# Patient Record
Sex: FEMALE | Race: BLACK OR AFRICAN AMERICAN | NOT HISPANIC OR LATINO | Employment: OTHER | URBAN - METROPOLITAN AREA
[De-identification: names, ages, dates, MRNs, and addresses within clinical notes are randomized per-mention and may not be internally consistent; named-entity substitution may affect disease eponyms.]

---

## 2018-07-20 ENCOUNTER — OFFICE VISIT CONVERTED (OUTPATIENT)
Dept: FAMILY MEDICINE CLINIC | Facility: CLINIC | Age: 68
End: 2018-07-20
Attending: FAMILY MEDICINE

## 2018-09-24 ENCOUNTER — OFFICE VISIT CONVERTED (OUTPATIENT)
Dept: FAMILY MEDICINE CLINIC | Facility: CLINIC | Age: 68
End: 2018-09-24
Attending: FAMILY MEDICINE

## 2019-01-22 ENCOUNTER — OFFICE VISIT CONVERTED (OUTPATIENT)
Dept: FAMILY MEDICINE CLINIC | Facility: CLINIC | Age: 69
End: 2019-01-22
Attending: FAMILY MEDICINE

## 2019-01-23 ENCOUNTER — HOSPITAL ENCOUNTER (OUTPATIENT)
Dept: FAMILY MEDICINE CLINIC | Facility: CLINIC | Age: 69
Discharge: HOME | End: 2019-01-23
Attending: FAMILY MEDICINE

## 2019-01-23 LAB
ALBUMIN SERPL-MCNC: 4.2 G/DL (ref 3.5–5)
ALBUMIN/GLOB SERPL: 1.3 {RATIO} (ref 1.4–2.6)
ALP SERPL-CCNC: 89 U/L (ref 43–160)
ALT SERPL-CCNC: 12 U/L (ref 10–40)
ANION GAP SERPL CALC-SCNC: 16 MMOL/L (ref 8–19)
AST SERPL-CCNC: 16 U/L (ref 15–50)
BILIRUB SERPL-MCNC: 0.29 MG/DL (ref 0.2–1.3)
BUN SERPL-MCNC: 12 MG/DL (ref 5–25)
BUN/CREAT SERPL: 14 {RATIO} (ref 6–20)
CALCIUM SERPL-MCNC: 10.5 MG/DL (ref 8.7–10.4)
CHLORIDE SERPL-SCNC: 98 MMOL/L (ref 99–111)
CHOLEST SERPL-MCNC: 258 MG/DL (ref 107–200)
CHOLEST/HDLC SERPL: 3.9 {RATIO} (ref 3–6)
CONV CO2: 27 MMOL/L (ref 22–32)
CONV CREATININE URINE, RANDOM: 63.4 MG/DL (ref 10–300)
CONV MICROALBUM.,U,RANDOM: <12 MG/L (ref 0–20)
CONV TOTAL PROTEIN: 7.5 G/DL (ref 6.3–8.2)
CREAT UR-MCNC: 0.87 MG/DL (ref 0.5–0.9)
EST. AVERAGE GLUCOSE BLD GHB EST-MCNC: 143 MG/DL
GFR SERPLBLD BASED ON 1.73 SQ M-ARVRAT: >60 ML/MIN/{1.73_M2}
GLOBULIN UR ELPH-MCNC: 3.3 G/DL (ref 2–3.5)
GLUCOSE SERPL-MCNC: 102 MG/DL (ref 65–99)
HBA1C MFR BLD: 6.6 % (ref 3.5–5.7)
HDLC SERPL-MCNC: 67 MG/DL (ref 40–60)
LDLC SERPL CALC-MCNC: 177 MG/DL (ref 70–100)
MICROALBUMIN/CREAT UR: 18.9 MG/G{CRE} (ref 0–35)
OSMOLALITY SERPL CALC.SUM OF ELEC: 284 MOSM/KG (ref 273–304)
POTASSIUM SERPL-SCNC: 4 MMOL/L (ref 3.5–5.3)
SODIUM SERPL-SCNC: 137 MMOL/L (ref 135–147)
TRIGL SERPL-MCNC: 69 MG/DL (ref 40–150)
VLDLC SERPL-MCNC: 14 MG/DL (ref 5–37)

## 2019-09-17 ENCOUNTER — HOSPITAL ENCOUNTER (OUTPATIENT)
Dept: FAMILY MEDICINE CLINIC | Facility: CLINIC | Age: 69
Discharge: HOME | End: 2019-09-17
Attending: FAMILY MEDICINE

## 2019-09-17 ENCOUNTER — CONVERSION ENCOUNTER (OUTPATIENT)
Dept: FAMILY MEDICINE CLINIC | Facility: CLINIC | Age: 69
End: 2019-09-17

## 2019-09-17 ENCOUNTER — OFFICE VISIT CONVERTED (OUTPATIENT)
Dept: FAMILY MEDICINE CLINIC | Facility: CLINIC | Age: 69
End: 2019-09-17
Attending: FAMILY MEDICINE

## 2019-09-17 LAB
ALBUMIN SERPL-MCNC: 4.6 G/DL (ref 3.5–5)
ALBUMIN/GLOB SERPL: 1.4 {RATIO} (ref 1.4–2.6)
ALP SERPL-CCNC: 94 U/L (ref 43–160)
ALT SERPL-CCNC: 13 U/L (ref 10–40)
ANION GAP SERPL CALC-SCNC: 16 MMOL/L (ref 8–19)
AST SERPL-CCNC: 18 U/L (ref 15–50)
BILIRUB SERPL-MCNC: 0.22 MG/DL (ref 0.2–1.3)
BUN SERPL-MCNC: 16 MG/DL (ref 5–25)
BUN/CREAT SERPL: 19 {RATIO} (ref 6–20)
CALCIUM SERPL-MCNC: 10.6 MG/DL (ref 8.7–10.4)
CHLORIDE SERPL-SCNC: 100 MMOL/L (ref 99–111)
CHOLEST SERPL-MCNC: 272 MG/DL (ref 107–200)
CHOLEST/HDLC SERPL: 3.9 {RATIO} (ref 3–6)
CONV CO2: 29 MMOL/L (ref 22–32)
CONV RHEUMATOID FACTOR IGM: <10 [IU]/ML (ref 0–14)
CONV TOTAL PROTEIN: 7.8 G/DL (ref 6.3–8.2)
CREAT UR-MCNC: 0.85 MG/DL (ref 0.5–0.9)
EST. AVERAGE GLUCOSE BLD GHB EST-MCNC: 151 MG/DL
GFR SERPLBLD BASED ON 1.73 SQ M-ARVRAT: >60 ML/MIN/{1.73_M2}
GLOBULIN UR ELPH-MCNC: 3.2 G/DL (ref 2–3.5)
GLUCOSE SERPL-MCNC: 103 MG/DL (ref 65–99)
HBA1C MFR BLD: 6.9 % (ref 3.5–5.7)
HDLC SERPL-MCNC: 70 MG/DL (ref 40–60)
LDLC SERPL CALC-MCNC: 188 MG/DL (ref 70–100)
OSMOLALITY SERPL CALC.SUM OF ELEC: 293 MOSM/KG (ref 273–304)
POTASSIUM SERPL-SCNC: 4.4 MMOL/L (ref 3.5–5.3)
SODIUM SERPL-SCNC: 141 MMOL/L (ref 135–147)
TRIGL SERPL-MCNC: 69 MG/DL (ref 40–150)
VLDLC SERPL-MCNC: 14 MG/DL (ref 5–37)

## 2019-09-18 ENCOUNTER — HOSPITAL ENCOUNTER (OUTPATIENT)
Dept: CARDIOLOGY | Facility: HOSPITAL | Age: 69
Discharge: HOME | End: 2019-09-18
Attending: FAMILY MEDICINE

## 2019-09-18 LAB — CONV ANTI NUCLEAR ANTIBODY WITH REFLEX: NEGATIVE

## 2019-09-24 ENCOUNTER — HOSPITAL ENCOUNTER (OUTPATIENT)
Dept: GENERAL RADIOLOGY | Facility: HOSPITAL | Age: 69
Discharge: HOME | End: 2019-09-24
Attending: FAMILY MEDICINE

## 2019-11-08 ENCOUNTER — HOSPITAL ENCOUNTER (OUTPATIENT)
Dept: MAMMOGRAPHY | Facility: HOSPITAL | Age: 69
Discharge: HOME | End: 2019-11-08
Attending: FAMILY MEDICINE

## 2019-12-03 ENCOUNTER — HOSPITAL ENCOUNTER (OUTPATIENT)
Dept: ULTRASOUND IMAGING | Facility: HOSPITAL | Age: 69
Discharge: HOME | End: 2019-12-03
Attending: FAMILY MEDICINE

## 2019-12-13 ENCOUNTER — OFFICE VISIT CONVERTED (OUTPATIENT)
Dept: FAMILY MEDICINE CLINIC | Facility: CLINIC | Age: 69
End: 2019-12-13
Attending: FAMILY MEDICINE

## 2020-11-24 ENCOUNTER — TELEPHONE CONVERTED (OUTPATIENT)
Dept: FAMILY MEDICINE CLINIC | Facility: CLINIC | Age: 70
End: 2020-11-24
Attending: FAMILY MEDICINE

## 2020-12-01 ENCOUNTER — HOSPITAL ENCOUNTER (OUTPATIENT)
Dept: FAMILY MEDICINE CLINIC | Facility: CLINIC | Age: 70
Discharge: HOME | End: 2020-12-01
Attending: FAMILY MEDICINE

## 2020-12-01 LAB
ALBUMIN SERPL-MCNC: 4.1 G/DL (ref 3.5–5)
ALBUMIN/GLOB SERPL: 1.4 {RATIO} (ref 1.4–2.6)
ALP SERPL-CCNC: 83 U/L (ref 43–160)
ALT SERPL-CCNC: 13 U/L (ref 10–40)
ANION GAP SERPL CALC-SCNC: 12 MMOL/L (ref 8–19)
AST SERPL-CCNC: 18 U/L (ref 15–50)
BILIRUB SERPL-MCNC: 0.22 MG/DL (ref 0.2–1.3)
BUN SERPL-MCNC: 18 MG/DL (ref 5–25)
BUN/CREAT SERPL: 19 {RATIO} (ref 6–20)
CALCIUM SERPL-MCNC: 10.1 MG/DL (ref 8.7–10.4)
CHLORIDE SERPL-SCNC: 102 MMOL/L (ref 99–111)
CHOLEST SERPL-MCNC: 254 MG/DL (ref 107–200)
CHOLEST/HDLC SERPL: 3.9 {RATIO} (ref 3–6)
CONV CO2: 25 MMOL/L (ref 22–32)
CONV CREATININE URINE, RANDOM: 78.6 MG/DL (ref 10–300)
CONV MICROALBUM.,U,RANDOM: <12 MG/L (ref 0–20)
CONV TOTAL PROTEIN: 7.1 G/DL (ref 6.3–8.2)
CREAT UR-MCNC: 0.93 MG/DL (ref 0.5–0.9)
EST. AVERAGE GLUCOSE BLD GHB EST-MCNC: 151 MG/DL
GFR SERPLBLD BASED ON 1.73 SQ M-ARVRAT: >60 ML/MIN/{1.73_M2}
GLOBULIN UR ELPH-MCNC: 3 G/DL (ref 2–3.5)
GLUCOSE SERPL-MCNC: 123 MG/DL (ref 65–99)
HBA1C MFR BLD: 6.9 % (ref 3.5–5.7)
HDLC SERPL-MCNC: 65 MG/DL (ref 40–60)
LDLC SERPL CALC-MCNC: 173 MG/DL (ref 70–100)
MICROALBUMIN/CREAT UR: 15.3 MG/G{CRE} (ref 0–35)
OSMOLALITY SERPL CALC.SUM OF ELEC: 283 MOSM/KG (ref 273–304)
POTASSIUM SERPL-SCNC: 3.8 MMOL/L (ref 3.5–5.3)
SODIUM SERPL-SCNC: 135 MMOL/L (ref 135–147)
TRIGL SERPL-MCNC: 78 MG/DL (ref 40–150)
VLDLC SERPL-MCNC: 16 MG/DL (ref 5–37)

## 2020-12-07 ENCOUNTER — TELEPHONE CONVERTED (OUTPATIENT)
Dept: FAMILY MEDICINE CLINIC | Facility: CLINIC | Age: 70
End: 2020-12-07
Attending: FAMILY MEDICINE

## 2021-04-27 ENCOUNTER — HOSPITAL ENCOUNTER (OUTPATIENT)
Dept: FAMILY MEDICINE CLINIC | Facility: CLINIC | Age: 71
Discharge: HOME | End: 2021-04-27
Attending: FAMILY MEDICINE

## 2021-04-27 LAB
ALBUMIN SERPL-MCNC: 4.4 G/DL (ref 3.5–5)
ALBUMIN/GLOB SERPL: 1.4 {RATIO} (ref 1.4–2.6)
ALP SERPL-CCNC: 83 U/L (ref 43–160)
ALT SERPL-CCNC: 15 U/L (ref 10–40)
ANION GAP SERPL CALC-SCNC: 15 MMOL/L (ref 8–19)
AST SERPL-CCNC: 20 U/L (ref 15–50)
BILIRUB SERPL-MCNC: 0.17 MG/DL (ref 0.2–1.3)
BUN SERPL-MCNC: 13 MG/DL (ref 5–25)
BUN/CREAT SERPL: 14 {RATIO} (ref 6–20)
CALCIUM SERPL-MCNC: 10.6 MG/DL (ref 8.7–10.4)
CHLORIDE SERPL-SCNC: 100 MMOL/L (ref 99–111)
CHOLEST SERPL-MCNC: 252 MG/DL (ref 107–200)
CHOLEST/HDLC SERPL: 3.8 {RATIO} (ref 3–6)
CONV CO2: 26 MMOL/L (ref 22–32)
CONV TOTAL PROTEIN: 7.5 G/DL (ref 6.3–8.2)
CREAT UR-MCNC: 0.93 MG/DL (ref 0.5–0.9)
EST. AVERAGE GLUCOSE BLD GHB EST-MCNC: 148 MG/DL
GFR SERPLBLD BASED ON 1.73 SQ M-ARVRAT: >60 ML/MIN/{1.73_M2}
GLOBULIN UR ELPH-MCNC: 3.1 G/DL (ref 2–3.5)
GLUCOSE SERPL-MCNC: 92 MG/DL (ref 65–99)
HBA1C MFR BLD: 6.8 % (ref 3.5–5.7)
HDLC SERPL-MCNC: 66 MG/DL (ref 40–60)
LDLC SERPL CALC-MCNC: 169 MG/DL (ref 70–100)
OSMOLALITY SERPL CALC.SUM OF ELEC: 284 MOSM/KG (ref 273–304)
POTASSIUM SERPL-SCNC: 4 MMOL/L (ref 3.5–5.3)
SODIUM SERPL-SCNC: 137 MMOL/L (ref 135–147)
TRIGL SERPL-MCNC: 86 MG/DL (ref 40–150)
VLDLC SERPL-MCNC: 17 MG/DL (ref 5–37)

## 2021-05-07 ENCOUNTER — TELEMEDICINE CONVERTED (OUTPATIENT)
Dept: FAMILY MEDICINE CLINIC | Facility: CLINIC | Age: 71
End: 2021-05-07
Attending: FAMILY MEDICINE

## 2021-05-13 NOTE — PROGRESS NOTES
Progress Note      Patient Name: Bety Sr   Patient ID: [id number]   Sex: Female   YOB: 1950    Primary Care Provider: Yaa Shepherd MD    Visit Date: November 24, 2020    Provider: Yaa Shepherd MD   [place] [facility] [place] Address: [address] [place] [address], [address] [place] [address]  [id number]   Location Phone: [phone]          Chief Complaint  · HTN follow up      History Of Present Illness  TELEHEALTH TELEPHONE VISIT  Bety Sr is a 70 year old /Black female who is presenting for evaluation via telehealth telephone visit. Verbal consent obtained before beginning visit.   Provider spent 25 minutes with patient during telehealth visit.   The following staff were present during this visit: Chantal Gibbons   Past Medical History/Overview of Patient Symptoms     HTN- pt reports that the clonidine was making her feel like her head was cloudy so we will be restarting Lisinopril 10mg.  Pt reports that she has been checking her blood pressures and they have been 160-170s systolic.  I will be following back up with her in 2 weeks.     DM type 2- pt last Hgb A1C was done Sept 2019 and was well controlled at 6.9 but we need to recheck it along with her cholesterol.     Hyperlipidemiapatient cholesterol levels was checked September 2020 and her cholesterol was 272 and LDL was 188 HDL was 70.  We will be rechecking her cholesterol levels fasting.  Follow-up in 2 weeks    Hot flashespatient reports that she was taking the clonidine for the hot flashes but really has not helped her as well.  We will reassess her hot flashes after getting her blood pressure under control.       Past Medical History  Cataract; Diabetes mellitus; Hyperlipidemia; Hypertension; Night sweat         Past Surgical History  Cesarian Section; Colonscopy; Hysterectomy; Tonsilectomy         Medication List  Aspirin Low Dose 81 mg oral tablet,delayed release (DR/EC); clonidine HCl  0.2 mg oral tablet         Allergy List  NO KNOWN DRUG ALLERGIES         Family Medical History  Heart Disease; Diabetes, unspecified type         Reproductive History   4 Para 3 0 0 0       Social History  Tobacco (Never)         Review of Systems  · Constitutional  o Denies  o : fatigue, fever, weight loss, weight gain  · Eyes  o Denies  o : eye discomfort, eye pain  · HENT  o Denies  o : vertigo, nasal congestion  · Genitourinary  o Denies  o : frequency, dysuria  · Integument  o Denies  o : rash, itching  · Neurologic  o Denies  o : muscular weakness, memory difficulties  · Musculoskeletal  o Denies  o : joint swelling, muscle pain  · Psychiatric  o Denies  o : anxiety, depression  · Heme-Lymph  o Denies  o : lightheadedness, easy bleeding  · Allergic-Immunologic  o Denies  o : sinus allergy symptoms, allergic dermatitis          Assessment  · Diabetes mellitus, type 2     250.00/E11.9  · Essential hypertension     401.9/I10  · Hyperlipidemia     272.4/E78.5  · Hot flashes due to menopause     627.2/N95.1      Plan  · Orders  o Diabetes 2 Panel (Urine Microalbumin, CMP, Lipid, A1c, ) [facility] (55664, 38545, 61158, 95019) - 250.00/E11.9 - 2020  o ACO-39: Current medications updated and reviewed (, 1159F) - - 2020  o Physician Telephone Evaluation, 21-30 minutes (51939) - 250.00/E11.9, 401.9/I10, 627.2/N95.1 - 2020  · Medications  o lisinopril 10 mg oral tablet   SIG: take 1 tablet (10 mg) by oral route once daily for 30 days   DISP: (30) Tablet with 2 refills  Prescribed on 2020     o Medications have been Reconciled  o Transition of Care or Provider Policy  · Instructions  o Advised that cheeses and other sources of dairy fats, animal fats, fast food, and the extras (candy, pastries, pies, doughnuts and cookies) all contain LDL raising nutrients. Advised to increase fruits, vegetables, whole grains, and to monitor portion sizes.   o Plan Of Care:   · Disposition  o Return Visit  Request in/on 2 weeks +/- 0 days (55727).            Electronically Signed by: Yaa Shepherd MD -Author on November 24, 2020 11:13:48 AM

## 2021-05-13 NOTE — PROGRESS NOTES
Progress Note      Patient Name: Bety Sr   Patient ID: [id number]   Sex: Female   YOB: 1950    Primary Care Provider: Yaa Shepherd MD   Referring Provider: Yaa Shepherd MD    Visit Date: December 7, 2020    Provider: Yaa Shepherd MD   Location: [facility] [place]   Location Address: [address] [place] [address], [address] [place] [address]  [id number]   Location Phone: [phone]          Chief Complaint  · DM type 2 follow up      History Of Present Illness  TELEHEALTH TELEPHONE VISIT  Bety Sr is a 70 year old /Black female who is presenting for evaluation via telehealth telephone visit. Verbal consent obtained before beginning visit.   Provider spent 30 minutes with patient during telehealth visit.   The following staff were present during this visit: Chantal Gibbons   Past Medical History/Overview of Patient Symptoms     Hyperlipidemiapatient's cholesterol level continues to be elevated with her cholesterol being 254 and LDL being 173.  Patient reports that she was on atorvastatin in the past and it caused her to have some muscle cramping so I will be putting her on pravastatin 10 mg just to provide treatment for her hyperlipidemia.    Hypertensionpatient reports that her blood pressures have been 144/78 146/91 and this morning was 148/79.  Patient reports that she did have one that was 133 systolic.  She is currently taking lisinopril 10 mg once a day and I will do not feel the need to increase her since she is at her goal of being less than 150/90.    Diabetes type 2patient hemoglobin A1c was 6.9 which is consistent with her last hemoglobin A1c which was checked last year.  Patient is following a diabetic diet.       Past Medical History  Cataract; Diabetes mellitus; Hyperlipidemia; Hypertension; Night sweat         Past Surgical History  Cesarian Section; Colonscopy; Hysterectomy; Tonsilectomy         Medication List  Aspirin Low Dose 81 mg oral  tablet,delayed release (DR/EC); clonidine HCl 0.2 mg oral tablet; lisinopril 10 mg oral tablet         Allergy List  NO KNOWN DRUG ALLERGIES         Family Medical History  Heart Disease; Diabetes, unspecified type         Reproductive History   4 Para 3 0 0 0       Social History  Tobacco (Never)         Review of Systems  · Constitutional  o Denies  o : fatigue, fever, weight loss, weight gain  · Eyes  o Denies  o : eye discomfort, eye pain  · HENT  o Denies  o : vertigo, nasal congestion  · Cardiovascular  o Denies  o : chest pain, irregular heart beats  · Respiratory  o Denies  o : shortness of breath, wheezing  · Gastrointestinal  o Denies  o : nausea, vomiting  · Genitourinary  o Denies  o : frequency, dysuria  · Integument  o Denies  o : rash, itching  · Neurologic  o Denies  o : muscular weakness, memory difficulties  · Musculoskeletal  o Denies  o : joint swelling, muscle pain  · Psychiatric  o Denies  o : anxiety, depression  · Heme-Lymph  o Denies  o : lightheadedness, easy bleeding  · Allergic-Immunologic  o Denies  o : sinus allergy symptoms, allergic dermatitis          Assessment  · Essential hypertension     401.9/I10  · Hyperlipidemia     272.4/E78.5      Plan  · Orders  o HTN/Lipid Panel (CMP, Lipid) [facility] (11002, 59550) - 401.9/I10 - 2021 - - (Future Order)  o ACO-39: Current medications updated and reviewed (1159F, ) - - 2020  o Physician Telephone Evaluation, 21-30 minutes (60805) - 272.4/E78.5, 401.9/I10 - 2020  · Medications  o pravastatin 10 mg oral tablet   SIG: take 1 tablet (10 mg) by oral route once daily at bedtime for 90 days   DISP: (90) Tablet with 1 refills  Prescribed on 2020     o clonidine HCl 0.2 mg oral tablet   SIG: take 1 tablet (0.2 mg) by oral route once daily for 30 days   DISP: (30) Tablet with 0 refills  Refilled on 2020     o lisinopril 10 mg oral tablet   SIG: take 1 tablet (10 mg) by oral route once daily for 30 days   DISP: (30)  Tablet with 2 refills  Refilled on 12/07/2020     o Medications have been Reconciled  o Transition of Care or Provider Policy  · Instructions  o Advised that cheeses and other sources of dairy fats, animal fats, fast food, and the extras (candy, pastries, pies, doughnuts and cookies) all contain LDL raising nutrients. Advised to increase fruits, vegetables, whole grains, and to monitor portion sizes.   o Plan Of Care:   o Chronic conditions reviewed and taken into consideration for today's treatment plan.  · Disposition  o Return Visit Request in/on 2 months +/- 0 days (74133).            Electronically Signed by: Yaa Shepherd MD -Author on December 7, 2020 10:03:21 AM

## 2021-05-15 VITALS
HEIGHT: 61 IN | DIASTOLIC BLOOD PRESSURE: 76 MMHG | OXYGEN SATURATION: 97 % | TEMPERATURE: 98.2 F | BODY MASS INDEX: 27.4 KG/M2 | WEIGHT: 145.12 LBS | HEART RATE: 63 BPM | SYSTOLIC BLOOD PRESSURE: 146 MMHG

## 2021-05-15 VITALS
BODY MASS INDEX: 26.81 KG/M2 | DIASTOLIC BLOOD PRESSURE: 82 MMHG | HEIGHT: 61 IN | SYSTOLIC BLOOD PRESSURE: 142 MMHG | WEIGHT: 142 LBS | HEART RATE: 63 BPM | TEMPERATURE: 97.9 F | OXYGEN SATURATION: 99 %

## 2021-05-15 VITALS
RESPIRATION RATE: 14 BRPM | SYSTOLIC BLOOD PRESSURE: 138 MMHG | OXYGEN SATURATION: 97 % | HEART RATE: 66 BPM | BODY MASS INDEX: 27.56 KG/M2 | HEIGHT: 61 IN | TEMPERATURE: 97.8 F | DIASTOLIC BLOOD PRESSURE: 86 MMHG | WEIGHT: 146 LBS

## 2021-05-16 VITALS
TEMPERATURE: 98.5 F | SYSTOLIC BLOOD PRESSURE: 142 MMHG | BODY MASS INDEX: 27 KG/M2 | DIASTOLIC BLOOD PRESSURE: 72 MMHG | HEART RATE: 80 BPM | OXYGEN SATURATION: 96 % | HEIGHT: 61 IN | WEIGHT: 143 LBS

## 2021-05-16 VITALS
BODY MASS INDEX: 26.62 KG/M2 | HEIGHT: 61 IN | HEART RATE: 78 BPM | TEMPERATURE: 97.9 F | OXYGEN SATURATION: 98 % | RESPIRATION RATE: 16 BRPM | DIASTOLIC BLOOD PRESSURE: 86 MMHG | WEIGHT: 141 LBS | SYSTOLIC BLOOD PRESSURE: 136 MMHG

## 2021-06-05 NOTE — PROGRESS NOTES
Progress Note      Patient Name: Bety Sr   Patient ID: [id number]   Sex: Female   YOB: 1950    Primary Care Provider: Yaa Shepherd MD   Referring Provider: Yaa Shepherd MD    Visit Date: May 7, 2021    Provider: Yaa Shepherd MD   [place] [facility] [place]   Location Address: [address] [place] [address], [address]  [id number]   Location Phone: [phone]          Chief Complaint  · Hypertension follow up      History Of Present Illness  Bety Sr is a 70 year old /Black female who presents for evaluation and treatment of:   Video Conferencing Visit  Bety Sr is a 70 year old /Black female who is presenting for evaluation via video conferencing via [facility]. Verbal consent obtained before beginning visit.   The following staff were present during this visit: Ana Chau   TELEHEALTH TELEPHONE VISIT  Bety Sr is a 70 year old /Black female who is presenting for evaluation via telehealth telephone visit. Verbal consent obtained before beginning visit.   Provider spent 25 minutes with patient during telehealth visit.   The following staff were present during this visit: Ana Chau   Past Medical History/Overview of Patient Symptoms     HTN- pt reports that her bp has been averaging 160s systolic over 80s.  pt reports she is compliant with taking her Lisinopril 10mg daily.  I will be increasing her to 20mg once a day.    DM type 2- pt A1C decreased from 6.9 to 6.8 which is good and under our goal of less than 7.      Hyperlipidemia- pt reports she has been getting pain in her legs with taking the Pravastatin so I will be discontinuing and starting her on Zetia 10mg once a day.       Past Medical History  Cataract; Diabetes mellitus; Hyperlipidemia; Hypertension; Night sweat         Past Surgical History  Cesarian Section; Colonscopy; Hysterectomy; Tonsilectomy         Medication  List  Aspirin Low Dose 81 mg oral tablet,delayed release (DR/EC); clonidine HCl 0.2 mg oral tablet; lisinopril 10 mg oral tablet; pravastatin 10 mg oral tablet         Allergy List  NO KNOWN DRUG ALLERGIES         Family Medical History  Heart Disease; Diabetes, unspecified type         Reproductive History   4 Para 3 0 0 0       Social History  Tobacco (Never)         Review of Systems  · Constitutional  o Denies  o : fatigue, fever, weight loss, weight gain  · Eyes  o Denies  o : eye discomfort, eye pain  · HENT  o Denies  o : vertigo, nasal congestion  · Cardiovascular  o Denies  o : chest pain, irregular heart beats  · Respiratory  o Denies  o : shortness of breath, wheezing  · Gastrointestinal  o Denies  o : nausea, vomiting  · Genitourinary  o Denies  o : frequency, dysuria  · Integument  o Denies  o : rash, itching  · Neurologic  o Denies  o : muscular weakness, memory difficulties  · Musculoskeletal  o Denies  o : joint swelling, muscle pain  · Psychiatric  o Denies  o : anxiety, depression  · Heme-Lymph  o Denies  o : lightheadedness, easy bleeding  · Allergic-Immunologic  o Denies  o : sinus allergy symptoms, allergic dermatitis      Physical Examination     Gen:  AxO x3, no acute distress    HEENT: EOMI     Respiratory:  No labored breathing    Psych: appropriate affect, judgement and insight intact.           Assessment  · Diabetes mellitus, type 2     250.00/E11.9  · Essential hypertension     401.9/I10  · Hyperlipidemia     272.4/E78.5  · Leg pain, bilateral       Pain in right leg     729.5/M79.604  Pain in left leg     729.5/M79.605      Plan  · Orders  o ACO-39: Current medications updated and reviewed (, 1159F) - - 2021  o Physician Telephone Evaluation, 21-30 minutes (75715) - 272.4/E78.5, 401.9/I10, 250.00/E11.9 - 2021  · Medications  o Zetia 10 mg oral tablet   SIG: take 1 tablet (10 mg) by oral route once daily for 90 days   DISP: (90) Tablet with 1 refills  Prescribed  on 05/07/2021     o lisinopril 20 mg oral tablet   SIG: take 1 tablet (20 mg) by oral route once daily for 90 days   DISP: (90) Tablet with 1 refills  Adjusted on 05/07/2021     o pravastatin 10 mg oral tablet   SIG: take 1 tablet (10 mg) by oral route once daily at bedtime for 90 days   DISP: (90) Tablet with 1 refills  Discontinued on 05/07/2021     o Medications have been Reconciled  o Transition of Care or Provider Policy  · Instructions  o Advised that cheeses and other sources of dairy fats, animal fats, fast food, and the extras (candy, pastries, pies, doughnuts and cookies) all contain LDL raising nutrients. Advised to increase fruits, vegetables, whole grains, and to monitor portion sizes.   o Patient was educated/instructed on their diagnosis, treatment and medications prior to discharge from the clinic today.  o Plan Of Care:   · Disposition  o Return Visit Request in/on 1 month +/- 0 days (27464).            Electronically Signed by: Yaa Shepherd MD -Author on May 7, 2021 06:02:30 PM

## 2021-11-01 ENCOUNTER — OFFICE VISIT (OUTPATIENT)
Dept: FAMILY MEDICINE CLINIC | Facility: CLINIC | Age: 71
End: 2021-11-01

## 2021-11-01 VITALS
WEIGHT: 145.4 LBS | DIASTOLIC BLOOD PRESSURE: 72 MMHG | BODY MASS INDEX: 26.76 KG/M2 | SYSTOLIC BLOOD PRESSURE: 136 MMHG | TEMPERATURE: 98.3 F | HEART RATE: 68 BPM | HEIGHT: 62 IN | OXYGEN SATURATION: 99 %

## 2021-11-01 DIAGNOSIS — Z00.00 MEDICARE ANNUAL WELLNESS VISIT, SUBSEQUENT: ICD-10-CM

## 2021-11-01 DIAGNOSIS — Z12.31 BREAST CANCER SCREENING BY MAMMOGRAM: ICD-10-CM

## 2021-11-01 DIAGNOSIS — E11.69 TYPE 2 DIABETES MELLITUS WITH OTHER SPECIFIED COMPLICATION, WITHOUT LONG-TERM CURRENT USE OF INSULIN (HCC): ICD-10-CM

## 2021-11-01 DIAGNOSIS — E78.5 HYPERLIPIDEMIA, UNSPECIFIED HYPERLIPIDEMIA TYPE: Primary | ICD-10-CM

## 2021-11-01 DIAGNOSIS — I10 PRIMARY HYPERTENSION: ICD-10-CM

## 2021-11-01 PROBLEM — H26.9 CATARACT: Status: ACTIVE | Noted: 2021-11-01

## 2021-11-01 PROBLEM — R61 NIGHT SWEAT: Status: ACTIVE | Noted: 2021-11-01

## 2021-11-01 PROBLEM — E11.9 DIABETES MELLITUS: Status: ACTIVE | Noted: 2021-11-01

## 2021-11-01 PROCEDURE — 1160F RVW MEDS BY RX/DR IN RCRD: CPT | Performed by: FAMILY MEDICINE

## 2021-11-01 PROCEDURE — 1170F FXNL STATUS ASSESSED: CPT | Performed by: FAMILY MEDICINE

## 2021-11-01 PROCEDURE — 96160 PT-FOCUSED HLTH RISK ASSMT: CPT | Performed by: FAMILY MEDICINE

## 2021-11-01 PROCEDURE — G0439 PPPS, SUBSEQ VISIT: HCPCS | Performed by: FAMILY MEDICINE

## 2021-11-01 RX ORDER — EZETIMIBE 10 MG/1
TABLET ORAL
COMMUNITY
Start: 2021-05-24 | End: 2022-08-29

## 2021-11-01 RX ORDER — LISINOPRIL 20 MG/1
TABLET ORAL
COMMUNITY
Start: 2021-08-21 | End: 2022-04-28 | Stop reason: SDUPTHER

## 2021-11-01 NOTE — PROGRESS NOTES
Chief Complaint  Chief Complaint   Patient presents with   • Medicare Wellness-subsequent       HPI:  Bety Sr presents to [facility]     Pt is doing well and is due for her annual wellness.     Patient is due to have her mammogram rechecked.  Patient last mammogram was done in 2019.  Patient reports history of vertigo 19 2020 due to Covid.    Diabetes type 2-patient's blood hemoglobin A1c was done April 2021 it was 6.8.  Patient is able to maintain off of medication for diabetes as long she is able to maintain her A1c less than 7.    Hypertension-patient's blood pressure today is well controlled at 136/72.  Patient currently taking lisinopril 20 mg once a day.      Cologuard-patient had her Cologuard done 2019 and it was negative.      Medical History:  Past History:  Medical History: has no past medical history on file.   Surgical History: has no past surgical history on file.   Family History: family history is not on file.   Social History: reports that she has never smoked. She has never used smokeless tobacco.  Immunization History   Administered Date(s) Administered   • COVID-19 (MODERNA) 1st, 2nd, 3rd Dose Only 04/22/2021, 05/20/2021         Allergies: Patient has no known allergies.     Medications:  Current Outpatient Medications on File Prior to Visit   Medication Sig Dispense Refill   • ezetimibe (Zetia) 10 MG tablet Zetia 10 mg oral tablet take 1 tablet (10 mg) by oral route once daily for 90 days 5/24/2021  Active     • lisinopril (PRINIVIL,ZESTRIL) 20 MG tablet        No current facility-administered medications on file prior to visit.        Health Maintenance Due   Topic Date Due   • DXA SCAN  Never done   • Pneumococcal Vaccine 65+ (1 of 2 - PPSV23) Never done   • TDAP/TD VACCINES (1 - Tdap) Never done   • ZOSTER VACCINE (1 of 2) Never done   • INFLUENZA VACCINE  Never done   • HEPATITIS C SCREENING  Never done   • DIABETIC FOOT EXAM  Never done   •  "HEMOGLOBIN A1C  11/01/2021   • DIABETIC EYE EXAM  11/01/2021   • COVID-19 Vaccine (3 - Booster for Moderna series) 11/20/2021       Vital Signs:   Vitals:    11/01/21 1342   BP: 136/72   Pulse: 68   Temp: 98.3 °F (36.8 °C)   SpO2: 99%   Weight: 66 kg (145 lb 6.4 oz)   Height: 156.2 cm (61.5\")      Body mass index is 27.03 kg/m².     ROS:  Review of Systems   Constitutional: Negative for fatigue and fever.   HENT: Negative for congestion, ear pain and sinus pressure.    Respiratory: Negative for cough, chest tightness and shortness of breath.    Cardiovascular: Negative for chest pain, palpitations and leg swelling.   Gastrointestinal: Negative for abdominal pain and diarrhea.   Genitourinary: Negative for dysuria and frequency.   Neurological: Negative for speech difficulty, headache and confusion.   Psychiatric/Behavioral: Negative for agitation and behavioral problems.          Physical Exam  Constitutional:       Appearance: Normal appearance.   HENT:      Right Ear: Tympanic membrane normal.      Left Ear: Tympanic membrane normal.      Nose: Nose normal.   Eyes:      Extraocular Movements: Extraocular movements intact.      Conjunctiva/sclera: Conjunctivae normal.      Pupils: Pupils are equal, round, and reactive to light.   Cardiovascular:      Rate and Rhythm: Normal rate and regular rhythm.   Pulmonary:      Effort: Pulmonary effort is normal.      Breath sounds: Normal breath sounds.   Abdominal:      General: Bowel sounds are normal.   Musculoskeletal:         General: Normal range of motion.      Cervical back: Normal range of motion.   Skin:     General: Skin is warm and dry.   Neurological:      General: No focal deficit present.      Mental Status: She is alert and oriented to person, place, and time.   Psychiatric:         Mood and Affect: Mood normal.         Behavior: Behavior normal.          Result Review   Hemoglobin A1c (04/27/2021 09:15)  Lipid Panel (04/27/2021 09:15)  Estimated Average " Glucose (04/27/2021 09:15)  Mammo Diagnostic Digital Tomosynthesis Bilateral With CAD (12/03/2019 15:01)  US Breast Bilateral Limited (12/03/2019 15:01)       Diagnoses and all orders for this visit:    1. Hyperlipidemia, unspecified hyperlipidemia type (Primary)  Assessment & Plan:  Lipid abnormalities are improving with treatment.  Pharmacotherapy as ordered.  Lipids will be reassessed in 3 months.    Orders:  -     Lipid Panel; Future    2. Primary hypertension    3. Type 2 diabetes mellitus with other specified complication, without long-term current use of insulin (HCC)  -     Hemoglobin A1c; Future  -     Comprehensive Metabolic Panel; Future    4. Breast cancer screening by mammogram  Assessment & Plan:  Pt was counseled on healthy eating habits to maintain good balanced nutrition and healthy weight.  Pt was counseled on increasing physical activity.  Pt was advised to practice safe sexual activities. Discouraged from the misuse of alcohol, tobacco and drugs.     Orders:  -     Cancel: Mammo Screening, Bilateral (Annually); Future  -     Mammo Screening, Bilateral (Annually); Future    5. Medicare annual wellness visit, subsequent  Assessment & Plan:  Pt was counseled on healthy eating habits to maintain good balanced nutrition and healthy weight.  Pt was counseled on increasing physical activity.  Pt was advised to practice safe sexual activities. Discouraged from the misuse of alcohol, tobacco and drugs.             Smoking Cessation:    Bety Sr  reports that she has never smoked. She has never used smokeless tobacco.          Follow Up   Return in about 6 months (around 5/1/2022).  Patient was given instructions and counseling regarding her condition or for health maintenance advice. Please see specific information pulled into the AVS if appropriate.       Yaa Shepherd MD

## 2021-11-02 PROBLEM — Z12.31 BREAST CANCER SCREENING BY MAMMOGRAM: Status: ACTIVE | Noted: 2021-11-02

## 2021-11-26 PROBLEM — Z00.00 MEDICARE ANNUAL WELLNESS VISIT, SUBSEQUENT: Status: ACTIVE | Noted: 2021-11-02

## 2021-11-26 NOTE — ASSESSMENT & PLAN NOTE
Lipid abnormalities are improving with treatment.  Pharmacotherapy as ordered.  Lipids will be reassessed in 3 months.  
Pt was counseled on healthy eating habits to maintain good balanced nutrition and healthy weight.  Pt was counseled on increasing physical activity.  Pt was advised to practice safe sexual activities. Discouraged from the misuse of alcohol, tobacco and drugs.   
FAMILY HISTORY:  No pertinent family history in first degree relatives

## 2022-01-25 ENCOUNTER — OFFICE VISIT (OUTPATIENT)
Dept: FAMILY MEDICINE CLINIC | Facility: CLINIC | Age: 72
End: 2022-01-25

## 2022-01-25 VITALS
DIASTOLIC BLOOD PRESSURE: 88 MMHG | SYSTOLIC BLOOD PRESSURE: 142 MMHG | TEMPERATURE: 97.2 F | OXYGEN SATURATION: 97 % | HEIGHT: 61 IN | RESPIRATION RATE: 14 BRPM | BODY MASS INDEX: 27.23 KG/M2 | WEIGHT: 144.2 LBS | HEART RATE: 65 BPM

## 2022-01-25 DIAGNOSIS — Z12.31 BREAST CANCER SCREENING BY MAMMOGRAM: ICD-10-CM

## 2022-01-25 DIAGNOSIS — E11.69 TYPE 2 DIABETES MELLITUS WITH OTHER SPECIFIED COMPLICATION, WITHOUT LONG-TERM CURRENT USE OF INSULIN: ICD-10-CM

## 2022-01-25 DIAGNOSIS — R10.13 EPIGASTRIC PAIN: Primary | ICD-10-CM

## 2022-01-25 DIAGNOSIS — Z12.11 COLON CANCER SCREENING: ICD-10-CM

## 2022-01-25 DIAGNOSIS — R31.9 HEMATURIA, UNSPECIFIED TYPE: ICD-10-CM

## 2022-01-25 DIAGNOSIS — E78.5 HYPERLIPIDEMIA, UNSPECIFIED HYPERLIPIDEMIA TYPE: ICD-10-CM

## 2022-01-25 DIAGNOSIS — R10.13 DYSPEPSIA: ICD-10-CM

## 2022-01-25 DIAGNOSIS — R23.2 HOT FLASHES: ICD-10-CM

## 2022-01-25 LAB
BILIRUB BLD-MCNC: NEGATIVE MG/DL
CLARITY, POC: CLEAR
COLOR UR: YELLOW
GLUCOSE UR STRIP-MCNC: NEGATIVE MG/DL
KETONES UR QL: NEGATIVE
LEUKOCYTE EST, POC: NEGATIVE
NITRITE UR-MCNC: NEGATIVE MG/ML
PH UR: 6.5 [PH] (ref 5–8)
PROT UR STRIP-MCNC: NEGATIVE MG/DL
RBC # UR STRIP: ABNORMAL /UL
SP GR UR: 1.03 (ref 1–1.03)
UROBILINOGEN UR QL: NORMAL

## 2022-01-25 PROCEDURE — 99214 OFFICE O/P EST MOD 30 MIN: CPT | Performed by: FAMILY MEDICINE

## 2022-01-25 PROCEDURE — 81002 URINALYSIS NONAUTO W/O SCOPE: CPT | Performed by: FAMILY MEDICINE

## 2022-01-25 NOTE — PROGRESS NOTES
Chief Complaint  Chief Complaint   Patient presents with   • Abdominal Pain       HPI:  Bety Sr presents to [facility]     Hot flashes- Pt reports she is taking Bonafide which is a tree pollen supplement for hot flashes.  Pt reports she is going to take it for at least 3 months.     Epigastric pain- pt reports that she has been having worsening epigastric discomfort after she eats for the last few months.  She is due to have her colon cancer screening this year.  She previously had a negative cologuard in 2019.      DM Type 2- pt last Hgb A1C was 6.8 on 4/27/2021 which was well controlled.  Currently compliant with medication regimen and will continue as prescribed.     HTN- Pt bp today is 142/88 which is well controlled.  Pt is compliant with their medication and will continue their current medication regimen. No side effects to the medication.    Medical History:  Past History:  Medical History: has a past medical history of Hypertension.   Surgical History: has a past surgical history that includes Hysterectomy (1995).   Family History: family history is not on file.   Social History: reports that she has never smoked. She has never used smokeless tobacco. She reports that she does not drink alcohol and does not use drugs.  There is no immunization history for the selected administration types on file for this patient.      Allergies: Patient has no known allergies.     Medications:  Current Outpatient Medications on File Prior to Visit   Medication Sig Dispense Refill   • ezetimibe (Zetia) 10 MG tablet Zetia 10 mg oral tablet take 1 tablet (10 mg) by oral route once daily for 90 days 5/24/2021  Active     • lisinopril (PRINIVIL,ZESTRIL) 20 MG tablet        No current facility-administered medications on file prior to visit.        Health Maintenance Due   Topic Date Due   • DXA SCAN  Never done   • Pneumococcal Vaccine 65+ (1 of 2 - PPSV23) Never done   • TDAP/TD  "VACCINES (1 - Tdap) Never done   • ZOSTER VACCINE (1 of 2) Never done   • HEPATITIS C SCREENING  Never done   • DIABETIC FOOT EXAM  Never done   • HEMOGLOBIN A1C  11/01/2021   • DIABETIC EYE EXAM  11/01/2021   • URINE MICROALBUMIN  12/01/2021   • MAMMOGRAM  12/03/2021       Vital Signs:   Vitals:    01/25/22 1044   BP: 142/88   BP Location: Right arm   Patient Position: Sitting   Pulse: 65   Resp: 14   Temp: 97.2 °F (36.2 °C)   SpO2: 97%   Weight: 65.4 kg (144 lb 3.2 oz)   Height: 154.9 cm (61\")      Body mass index is 27.25 kg/m².     ROS:  Review of Systems   Constitutional: Negative for fatigue and fever.   HENT: Negative for congestion, ear pain and sinus pressure.    Respiratory: Negative for cough, chest tightness and shortness of breath.    Cardiovascular: Negative for chest pain, palpitations and leg swelling.   Gastrointestinal: Positive for abdominal pain and indigestion. Negative for diarrhea.   Genitourinary: Negative for dysuria and frequency.   Neurological: Negative for speech difficulty, headache and confusion.   Psychiatric/Behavioral: Negative for agitation and behavioral problems.          Physical Exam  Constitutional:       Appearance: Normal appearance.   HENT:      Right Ear: Tympanic membrane normal.      Left Ear: Tympanic membrane normal.      Nose: Nose normal.   Eyes:      Extraocular Movements: Extraocular movements intact.      Conjunctiva/sclera: Conjunctivae normal.      Pupils: Pupils are equal, round, and reactive to light.   Cardiovascular:      Rate and Rhythm: Normal rate and regular rhythm.   Pulmonary:      Effort: Pulmonary effort is normal.      Breath sounds: Normal breath sounds.   Abdominal:      General: Bowel sounds are normal. There is no distension.      Palpations: There is no mass.      Tenderness: There is no guarding.   Musculoskeletal:         General: Normal range of motion.      Cervical back: Normal range of motion.   Skin:     General: Skin is warm and dry. "   Neurological:      General: No focal deficit present.      Mental Status: She is alert and oriented to person, place, and time.   Psychiatric:         Mood and Affect: Mood normal.         Behavior: Behavior normal.          Result Review    Hemoglobin A1c (04/27/2021 09:15)  Lipid Panel (04/27/2021 09:15)  Mammo Diagnostic Digital Tomosynthesis Bilateral With CAD (12/03/2019 15:01)       Diagnoses and all orders for this visit:    1. Epigastric pain (Primary)  -     Amylase  -     Helicobacter Pylori, IgA IgG IgM  -     Lipase    2. Hematuria, unspecified type  -     POCT urinalysis dipstick, manual    3. Type 2 diabetes mellitus with other specified complication, without long-term current use of insulin (HCC)  -     Hemoglobin A1c  -     Comprehensive Metabolic Panel    4. Hyperlipidemia, unspecified hyperlipidemia type  -     Lipid Panel    5. Dyspepsia  -     Ambulatory Referral to Gastroenterology    6. Colon cancer screening  -     Ambulatory Referral to Gastroenterology    7. Breast cancer screening by mammogram  -     Mammo Screening Digital Tomosynthesis Bilateral With CAD; Future    8. Hot flashes          Smoking Cessation:    Bety Sr  reports that she has never smoked. She has never used smokeless tobacco.          Follow Up   Return in about 4 weeks (around 2/22/2022).  Patient was given instructions and counseling regarding her condition or for health maintenance advice. Please see specific information pulled into the AVS if appropriate.       Yaa Shepherd MD

## 2022-01-27 PROBLEM — R23.2 HOT FLASHES: Status: ACTIVE | Noted: 2022-01-27

## 2022-02-10 ENCOUNTER — TELEPHONE (OUTPATIENT)
Dept: GASTROENTEROLOGY | Facility: CLINIC | Age: 72
End: 2022-02-10

## 2022-02-10 ENCOUNTER — LAB (OUTPATIENT)
Dept: LAB | Facility: HOSPITAL | Age: 72
End: 2022-02-10

## 2022-02-10 LAB
ALBUMIN SERPL-MCNC: 5 G/DL (ref 3.5–5.2)
ALBUMIN/GLOB SERPL: 1.6 G/DL
ALP SERPL-CCNC: 87 U/L (ref 39–117)
ALT SERPL W P-5'-P-CCNC: 14 U/L (ref 1–33)
AMYLASE SERPL-CCNC: 87 U/L (ref 28–100)
ANION GAP SERPL CALCULATED.3IONS-SCNC: 9.8 MMOL/L (ref 5–15)
AST SERPL-CCNC: 18 U/L (ref 1–32)
BILIRUB SERPL-MCNC: 0.3 MG/DL (ref 0–1.2)
BUN SERPL-MCNC: 12 MG/DL (ref 8–23)
BUN/CREAT SERPL: 13.5 (ref 7–25)
CALCIUM SPEC-SCNC: 11.1 MG/DL (ref 8.6–10.5)
CHLORIDE SERPL-SCNC: 101 MMOL/L (ref 98–107)
CHOLEST SERPL-MCNC: 296 MG/DL (ref 0–200)
CO2 SERPL-SCNC: 28.2 MMOL/L (ref 22–29)
CREAT SERPL-MCNC: 0.89 MG/DL (ref 0.57–1)
GFR SERPL CREATININE-BSD FRML MDRD: 76 ML/MIN/1.73
GLOBULIN UR ELPH-MCNC: 3.1 GM/DL
GLUCOSE SERPL-MCNC: 106 MG/DL (ref 65–99)
HBA1C MFR BLD: 6.6 % (ref 4.8–5.6)
HDLC SERPL-MCNC: 82 MG/DL (ref 40–60)
LDLC SERPL CALC-MCNC: 203 MG/DL (ref 0–100)
LDLC/HDLC SERPL: 2.43 {RATIO}
LIPASE SERPL-CCNC: 45 U/L (ref 13–60)
POTASSIUM SERPL-SCNC: 4.3 MMOL/L (ref 3.5–5.2)
PROT SERPL-MCNC: 8.1 G/DL (ref 6–8.5)
SODIUM SERPL-SCNC: 139 MMOL/L (ref 136–145)
TRIGL SERPL-MCNC: 73 MG/DL (ref 0–150)
VLDLC SERPL-MCNC: 11 MG/DL (ref 5–40)

## 2022-02-10 PROCEDURE — 80053 COMPREHEN METABOLIC PANEL: CPT | Performed by: FAMILY MEDICINE

## 2022-02-10 PROCEDURE — 80061 LIPID PANEL: CPT | Performed by: FAMILY MEDICINE

## 2022-02-10 PROCEDURE — 82150 ASSAY OF AMYLASE: CPT | Performed by: FAMILY MEDICINE

## 2022-02-10 PROCEDURE — 83036 HEMOGLOBIN GLYCOSYLATED A1C: CPT | Performed by: FAMILY MEDICINE

## 2022-02-10 PROCEDURE — 86677 HELICOBACTER PYLORI ANTIBODY: CPT | Performed by: FAMILY MEDICINE

## 2022-02-10 PROCEDURE — 83690 ASSAY OF LIPASE: CPT | Performed by: FAMILY MEDICINE

## 2022-02-10 NOTE — TELEPHONE ENCOUNTER
Patient walk in for her appointment, she was inform of her co-pay, she wished to be billed. She was given a release to be filled out, she than told me she wanted to cancelled he appointment. I ask her does she really want to cancelled the appointment, she said yes and walked out.

## 2022-02-11 ENCOUNTER — TELEPHONE (OUTPATIENT)
Dept: FAMILY MEDICINE CLINIC | Facility: CLINIC | Age: 72
End: 2022-02-11

## 2022-02-11 LAB
H PYLORI IGA SER-ACNC: 27.2 UNITS (ref 0–8.9)
H PYLORI IGG SER IA-ACNC: 8.34 INDEX VALUE (ref 0–0.79)
H PYLORI IGM SER-ACNC: <9 UNITS (ref 0–8.9)

## 2022-02-11 NOTE — TELEPHONE ENCOUNTER
Caller: Bety Sr    Relationship: Self    Best call back number: [phone]    What is the best time to reach you: ANYTIME    What was the call regarding: PATIENT RECEIVED A LETTER TO SCHEDULE HER MAMMOGRAM. SHE WANTED TO LET US KNOW THAT SHE IS SCHEDULED IN April FOR IT.     Do you require a callback: IF NEEDED

## 2022-02-24 ENCOUNTER — OFFICE VISIT (OUTPATIENT)
Dept: FAMILY MEDICINE CLINIC | Facility: CLINIC | Age: 72
End: 2022-02-24

## 2022-02-24 VITALS
HEART RATE: 75 BPM | OXYGEN SATURATION: 99 % | TEMPERATURE: 97.5 F | DIASTOLIC BLOOD PRESSURE: 84 MMHG | BODY MASS INDEX: 27.23 KG/M2 | HEIGHT: 61 IN | RESPIRATION RATE: 14 BRPM | SYSTOLIC BLOOD PRESSURE: 138 MMHG | WEIGHT: 144.2 LBS

## 2022-02-24 DIAGNOSIS — E78.5 HYPERLIPIDEMIA, UNSPECIFIED HYPERLIPIDEMIA TYPE: ICD-10-CM

## 2022-02-24 DIAGNOSIS — Z23 NEED FOR PNEUMOCOCCAL VACCINE: Primary | ICD-10-CM

## 2022-02-24 DIAGNOSIS — I10 PRIMARY HYPERTENSION: ICD-10-CM

## 2022-02-24 DIAGNOSIS — E11.69 TYPE 2 DIABETES MELLITUS WITH OTHER SPECIFIED COMPLICATION, WITHOUT LONG-TERM CURRENT USE OF INSULIN: ICD-10-CM

## 2022-02-24 DIAGNOSIS — A04.8 H. PYLORI INFECTION: ICD-10-CM

## 2022-02-24 PROCEDURE — 99214 OFFICE O/P EST MOD 30 MIN: CPT | Performed by: FAMILY MEDICINE

## 2022-02-24 RX ORDER — AMOXICILLIN 500 MG/1
1000 CAPSULE ORAL 2 TIMES DAILY
Qty: 56 CAPSULE | Refills: 0 | Status: SHIPPED | OUTPATIENT
Start: 2022-02-24 | End: 2022-03-10

## 2022-02-24 RX ORDER — OMEPRAZOLE 20 MG/1
20 CAPSULE, DELAYED RELEASE ORAL 2 TIMES DAILY
Qty: 28 CAPSULE | Refills: 0 | Status: SHIPPED | OUTPATIENT
Start: 2022-02-24 | End: 2022-03-10

## 2022-02-24 RX ORDER — CLARITHROMYCIN 500 MG/1
500 TABLET, COATED ORAL 2 TIMES DAILY
Qty: 28 TABLET | Refills: 0 | Status: SHIPPED | OUTPATIENT
Start: 2022-02-24 | End: 2022-03-10

## 2022-04-05 ENCOUNTER — HOSPITAL ENCOUNTER (OUTPATIENT)
Dept: MAMMOGRAPHY | Facility: HOSPITAL | Age: 72
Discharge: HOME | End: 2022-04-05
Admitting: FAMILY MEDICINE

## 2022-04-05 DIAGNOSIS — Z12.31 BREAST CANCER SCREENING BY MAMMOGRAM: ICD-10-CM

## 2022-04-05 PROCEDURE — 77063 BREAST TOMOSYNTHESIS BI: CPT

## 2022-04-05 PROCEDURE — 77067 SCR MAMMO BI INCL CAD: CPT

## 2022-04-11 ENCOUNTER — TELEPHONE (OUTPATIENT)
Dept: FAMILY MEDICINE CLINIC | Facility: CLINIC | Age: 72
End: 2022-04-11

## 2022-04-11 NOTE — TELEPHONE ENCOUNTER
Caller: Bety Sr    Relationship: Self  Best call back number: [phone]    What is the best time to reach you: ANY    Who are you requesting to speak with (clinical staff, provider,  specific staff member): CLINICAL    What was the call regarding: PATIENT STATED: SHE IS TO HAVE LABS PRIOR TO 4/28/22 FOLLOW UP AND REQUESTING CALL TO KNOW IF THERE IS A PREFERRED DATE THAT SH HAVE THE LAB WORK     Do you require a callback: YES

## 2022-04-21 ENCOUNTER — LAB (OUTPATIENT)
Dept: FAMILY MEDICINE CLINIC | Facility: CLINIC | Age: 72
End: 2022-04-21

## 2022-04-21 DIAGNOSIS — A04.8 H. PYLORI INFECTION: ICD-10-CM

## 2022-04-21 LAB — UREA BREATH TEST QL: NEGATIVE

## 2022-04-21 PROCEDURE — 83013 H PYLORI (C-13) BREATH: CPT | Performed by: FAMILY MEDICINE

## 2022-04-27 RX ORDER — LISINOPRIL 10 MG/1
10 TABLET ORAL DAILY
COMMUNITY
Start: 2022-02-19 | End: 2022-04-28

## 2022-04-28 ENCOUNTER — OFFICE VISIT (OUTPATIENT)
Dept: FAMILY MEDICINE CLINIC | Facility: CLINIC | Age: 72
End: 2022-04-28

## 2022-04-28 VITALS
HEIGHT: 61 IN | TEMPERATURE: 98.2 F | BODY MASS INDEX: 27.3 KG/M2 | WEIGHT: 144.6 LBS | SYSTOLIC BLOOD PRESSURE: 142 MMHG | OXYGEN SATURATION: 98 % | DIASTOLIC BLOOD PRESSURE: 80 MMHG | HEART RATE: 70 BPM

## 2022-04-28 DIAGNOSIS — E78.5 HYPERLIPIDEMIA, UNSPECIFIED HYPERLIPIDEMIA TYPE: ICD-10-CM

## 2022-04-28 DIAGNOSIS — E11.69 TYPE 2 DIABETES MELLITUS WITH OTHER SPECIFIED COMPLICATION, WITHOUT LONG-TERM CURRENT USE OF INSULIN: ICD-10-CM

## 2022-04-28 DIAGNOSIS — I10 PRIMARY HYPERTENSION: Primary | ICD-10-CM

## 2022-04-28 PROCEDURE — 99214 OFFICE O/P EST MOD 30 MIN: CPT | Performed by: FAMILY MEDICINE

## 2022-04-28 RX ORDER — LISINOPRIL 20 MG/1
20 TABLET ORAL DAILY
Qty: 90 TABLET | Refills: 3 | Status: SHIPPED | OUTPATIENT
Start: 2022-04-28 | End: 2022-07-27

## 2022-04-28 NOTE — PROGRESS NOTES
Chief Complaint  Chief Complaint   Patient presents with   • Labs Only     Results        HPI:  Bety Sr presents to [facility]    HTN- Pt BP today is 142/80 which is well controlled.  Pt is compliant with their medication and will continue their current medication regimen. No side effects to the medication. Pt reports she has had some occassional high blood pressure readings and on average they are under 150/90.      Hyperlipidemia-patient is currently taking Zetia 10 mg once a day and needs time to recheck her cholesterol levels to see if they are lowering.  Patient is not fasting today so she has had breakfast so I will be putting her labs in for her to come back in the exam but I instructed her that if they are still high I will need to adjust her cholesterol medication.    DM Type 2- pt last Hgb A1C was 6.60 which was well controlled.  Currently compliant with diabetic diet and will continue to follow up.     Medical History:  Past History:  Medical History: has a past medical history of Hypertension.   Surgical History: has a past surgical history that includes Hysterectomy (1995).   Family History: family history is not on file.   Social History: reports that she has never smoked. She has never used smokeless tobacco. She reports that she does not drink alcohol and does not use drugs.  Immunization History   Administered Date(s) Administered   • COVID-19 (MODERNA) 1st, 2nd, 3rd Dose Only 04/22/2021, 05/20/2021, 11/22/2021         Allergies: Patient has no known allergies.     Medications:  Current Outpatient Medications on File Prior to Visit   Medication Sig Dispense Refill   • ezetimibe (ZETIA) 10 MG tablet Zetia 10 mg oral tablet take 1 tablet (10 mg) by oral route once daily for 90 days 5/24/2021  Active     • [DISCONTINUED] lisinopril (PRINIVIL,ZESTRIL) 20 MG tablet      • [DISCONTINUED] lisinopril (PRINIVIL,ZESTRIL) 10 MG tablet Take 10 mg by mouth Daily.    "    No current facility-administered medications on file prior to visit.        Health Maintenance Due   Topic Date Due   • DXA SCAN  Never done   • Pneumococcal Vaccine 65+ (1 - PCV) Never done   • TDAP/TD VACCINES (1 - Tdap) Never done   • ZOSTER VACCINE (1 of 2) Never done   • HEPATITIS C SCREENING  Never done   • DIABETIC FOOT EXAM  Never done   • DIABETIC EYE EXAM  11/01/2021   • URINE MICROALBUMIN  12/01/2021       Vital Signs:   Vitals:    04/28/22 1147   BP: 142/80   Pulse: 70   Temp: 98.2 °F (36.8 °C)   SpO2: 98%   Weight: 65.6 kg (144 lb 9.6 oz)   Height: 154.9 cm (61\")      Body mass index is 27.32 kg/m².     ROS:  Review of Systems   Constitutional: Negative for fatigue and fever.   HENT: Negative for congestion, ear pain and sinus pressure.    Respiratory: Negative for cough, chest tightness and shortness of breath.    Cardiovascular: Negative for chest pain, palpitations and leg swelling.   Gastrointestinal: Negative for abdominal pain and diarrhea.   Genitourinary: Negative for dysuria and frequency.   Neurological: Negative for speech difficulty, headache and confusion.   Psychiatric/Behavioral: Negative for agitation and behavioral problems.          Physical Exam  Constitutional:       Appearance: Normal appearance.   HENT:      Right Ear: Tympanic membrane normal.      Left Ear: Tympanic membrane normal.      Nose: Nose normal.   Eyes:      Extraocular Movements: Extraocular movements intact.      Conjunctiva/sclera: Conjunctivae normal.      Pupils: Pupils are equal, round, and reactive to light.   Cardiovascular:      Rate and Rhythm: Normal rate and regular rhythm.   Pulmonary:      Effort: Pulmonary effort is normal.      Breath sounds: Normal breath sounds.   Abdominal:      General: Bowel sounds are normal.   Musculoskeletal:         General: Normal range of motion.      Cervical back: Normal range of motion.   Skin:     General: Skin is warm and dry.   Neurological:      General: No focal " deficit present.      Mental Status: She is alert and oriented to person, place, and time.   Psychiatric:         Mood and Affect: Mood normal.         Behavior: Behavior normal.          Result Review     Lipid Panel (02/10/2022 12:43)  Comprehensive Metabolic Panel (02/10/2022 12:43)  Hemoglobin A1c (02/10/2022 12:43)    Diagnoses and all orders for this visit:    1. Primary hypertension (Primary)  -     lisinopril (PRINIVIL,ZESTRIL) 20 MG tablet; Take 1 tablet by mouth Daily for 90 days.  Dispense: 90 tablet; Refill: 3    2. Hyperlipidemia, unspecified hyperlipidemia type  -     Lipid Panel; Future  -     Comprehensive Metabolic Panel; Future    3. Type 2 diabetes mellitus with other specified complication, without long-term current use of insulin (HCC)          Smoking Cessation:    Bety Sr  reports that she has never smoked. She has never used smokeless tobacco.          Follow Up   Return in about 3 months (around 7/28/2022).  Patient was given instructions and counseling regarding her condition or for health maintenance advice. Please see specific information pulled into the AVS if appropriate.       Yaa Shepherd MD

## 2022-08-29 RX ORDER — EZETIMIBE 10 MG/1
TABLET ORAL
Qty: 90 TABLET | Refills: 0 | Status: SHIPPED | OUTPATIENT
Start: 2022-08-29

## 2022-11-08 ENCOUNTER — TELEPHONE (OUTPATIENT)
Dept: FAMILY MEDICINE CLINIC | Facility: CLINIC | Age: 72
End: 2022-11-08

## 2023-03-07 ENCOUNTER — TELEPHONE (OUTPATIENT)
Dept: GENERAL RADIOLOGY | Facility: HOSPITAL | Age: 73
End: 2023-03-07
Payer: MEDICARE

## 2023-03-07 NOTE — TELEPHONE ENCOUNTER
Patient annual mammography reminder letter was returned for reason attempted not known. Per the patient she has [place] and does not wish to receive the letter.

## 2023-05-22 ENCOUNTER — TELEPHONE (OUTPATIENT)
Dept: GENERAL RADIOLOGY | Facility: HOSPITAL | Age: 73
End: 2023-05-22
Payer: MEDICARE

## 2023-05-22 NOTE — TELEPHONE ENCOUNTER
Patient annual mammography reminder letter was returned for reason not deliverable as addressed. Per the patient, she has moved far away from [place] and does not want to give current mailing information.

## 2023-07-27 ENCOUNTER — DOCUMENTATION (OUTPATIENT)
Dept: FAMILY MEDICINE CLINIC | Facility: CLINIC | Age: 73
End: 2023-07-27
Payer: MEDICARE